# Patient Record
Sex: MALE | Race: WHITE
[De-identification: names, ages, dates, MRNs, and addresses within clinical notes are randomized per-mention and may not be internally consistent; named-entity substitution may affect disease eponyms.]

---

## 2018-08-05 ENCOUNTER — HOSPITAL ENCOUNTER (EMERGENCY)
Dept: HOSPITAL 62 - ER | Age: 28
Discharge: HOME | End: 2018-08-05
Payer: COMMERCIAL

## 2018-08-05 VITALS — DIASTOLIC BLOOD PRESSURE: 76 MMHG | SYSTOLIC BLOOD PRESSURE: 111 MMHG

## 2018-08-05 DIAGNOSIS — R10.11: ICD-10-CM

## 2018-08-05 DIAGNOSIS — R11.2: ICD-10-CM

## 2018-08-05 DIAGNOSIS — R61: ICD-10-CM

## 2018-08-05 DIAGNOSIS — N13.2: Primary | ICD-10-CM

## 2018-08-05 DIAGNOSIS — R23.1: ICD-10-CM

## 2018-08-05 LAB
ADD MANUAL DIFF: NO
ALBUMIN SERPL-MCNC: 4.4 G/DL (ref 3.5–5)
ALP SERPL-CCNC: 48 U/L (ref 38–126)
ALT SERPL-CCNC: 20 U/L (ref 21–72)
ANION GAP SERPL CALC-SCNC: 16 MMOL/L (ref 5–19)
APPEARANCE UR: (no result)
APTT PPP: YELLOW S
AST SERPL-CCNC: 16 U/L (ref 17–59)
BASOPHILS # BLD AUTO: 0 10^3/UL (ref 0–0.2)
BASOPHILS NFR BLD AUTO: 0.3 % (ref 0–2)
BILIRUB DIRECT SERPL-MCNC: 0.3 MG/DL (ref 0–0.4)
BILIRUB SERPL-MCNC: 0.5 MG/DL (ref 0.2–1.3)
BILIRUB UR QL STRIP: NEGATIVE
BUN SERPL-MCNC: 16 MG/DL (ref 7–20)
CALCIUM: 10 MG/DL (ref 8.4–10.2)
CHLORIDE SERPL-SCNC: 103 MMOL/L (ref 98–107)
CO2 SERPL-SCNC: 25 MMOL/L (ref 22–30)
EOSINOPHIL # BLD AUTO: 0 10^3/UL (ref 0–0.6)
EOSINOPHIL NFR BLD AUTO: 0.2 % (ref 0–6)
ERYTHROCYTE [DISTWIDTH] IN BLOOD BY AUTOMATED COUNT: 12.8 % (ref 11.5–14)
GLUCOSE SERPL-MCNC: 106 MG/DL (ref 75–110)
GLUCOSE UR STRIP-MCNC: NEGATIVE MG/DL
HCT VFR BLD CALC: 37.5 % (ref 37.9–51)
HGB BLD-MCNC: 13.2 G/DL (ref 13.5–17)
KETONES UR STRIP-MCNC: NEGATIVE MG/DL
LIPASE SERPL-CCNC: 103.9 U/L (ref 23–300)
LYMPHOCYTES # BLD AUTO: 3 10^3/UL (ref 0.5–4.7)
LYMPHOCYTES NFR BLD AUTO: 42.3 % (ref 13–45)
MCH RBC QN AUTO: 32.3 PG (ref 27–33.4)
MCHC RBC AUTO-ENTMCNC: 35.3 G/DL (ref 32–36)
MCV RBC AUTO: 92 FL (ref 80–97)
MONOCYTES # BLD AUTO: 0.5 10^3/UL (ref 0.1–1.4)
MONOCYTES NFR BLD AUTO: 6.6 % (ref 3–13)
NEUTROPHILS # BLD AUTO: 3.6 10^3/UL (ref 1.7–8.2)
NEUTS SEG NFR BLD AUTO: 50.6 % (ref 42–78)
NITRITE UR QL STRIP: NEGATIVE
PH UR STRIP: 8 [PH] (ref 5–9)
PLATELET # BLD: 246 10^3/UL (ref 150–450)
POTASSIUM SERPL-SCNC: 3.7 MMOL/L (ref 3.6–5)
PROT SERPL-MCNC: 6.9 G/DL (ref 6.3–8.2)
PROT UR STRIP-MCNC: NEGATIVE MG/DL
RBC # BLD AUTO: 4.09 10^6/UL (ref 4.35–5.55)
SODIUM SERPL-SCNC: 143.6 MMOL/L (ref 137–145)
SP GR UR STRIP: 1.01
TOTAL CELLS COUNTED % (AUTO): 100 %
UROBILINOGEN UR-MCNC: NEGATIVE MG/DL (ref ?–2)
WBC # BLD AUTO: 7.2 10^3/UL (ref 4–10.5)

## 2018-08-05 PROCEDURE — 96375 TX/PRO/DX INJ NEW DRUG ADDON: CPT

## 2018-08-05 PROCEDURE — 80053 COMPREHEN METABOLIC PANEL: CPT

## 2018-08-05 PROCEDURE — 99284 EMERGENCY DEPT VISIT MOD MDM: CPT

## 2018-08-05 PROCEDURE — 83690 ASSAY OF LIPASE: CPT

## 2018-08-05 PROCEDURE — 87086 URINE CULTURE/COLONY COUNT: CPT

## 2018-08-05 PROCEDURE — 96361 HYDRATE IV INFUSION ADD-ON: CPT

## 2018-08-05 PROCEDURE — 81001 URINALYSIS AUTO W/SCOPE: CPT

## 2018-08-05 PROCEDURE — 85025 COMPLETE CBC W/AUTO DIFF WBC: CPT

## 2018-08-05 PROCEDURE — 96374 THER/PROPH/DIAG INJ IV PUSH: CPT

## 2018-08-05 PROCEDURE — 36415 COLL VENOUS BLD VENIPUNCTURE: CPT

## 2018-08-05 PROCEDURE — 76705 ECHO EXAM OF ABDOMEN: CPT

## 2018-08-05 NOTE — ER DOCUMENT REPORT
ED GI/





- General


Stated Complaint: FLANK PAIN


Time Seen by Provider: 08/05/18 04:14


Notes: 


Patient is a 27-year-old male who comes emergency department for chief 

complaint of right upper quadrant abdominal pain that radiates around his right 

flank and about 5 episodes of vomiting.  Symptoms started about 5 hours ago.  

Patient denies fever chills, had a normal bowel movement in the past day.  He 

has had kidney stones in the past, kidney stone removal, has had a history of 

pneumothorax.  He denies smoking, alcohol, or recreational drugs.  He denies 

any daily medications.








- Related Data


Allergies/Adverse Reactions: 


 





No Known Allergies Allergy (Verified 08/05/18 04:40)


 











Past Medical History





- General


Information source: Patient





- Social History


Smoking Status: Never Smoker


Frequency of alcohol use: None


Drug Abuse: None


Lives with: Family


Family History: Reviewed & Not Pertinent


Pulmonary Medical History: Reports: Other - Pneumothorax


Renal/ Medical History: Reports: Hx Kidney Stones


Past Surgical History: Reports: Hx Genitourinary Surgery - Ureteral stent





- Immunizations


Immunizations up to date: Yes


Hx Diphtheria, Pertussis, Tetanus Vaccination: Yes





Review of Systems





- Review of Systems


Constitutional: No symptoms reported


EENT: No symptoms reported


Cardiovascular: No symptoms reported


Respiratory: No symptoms reported


Gastrointestinal: See HPI


Genitourinary: See HPI


Male Genitourinary: See HPI


Musculoskeletal: No symptoms reported


Skin: No symptoms reported


Hematologic/Lymphatic: No symptoms reported


Neurological/Psychological: No symptoms reported





Physical Exam





- Vital signs


Vitals: 


 











Temp Pulse Resp BP Pulse Ox


 


 98.1 F   82   27 H  127/100 H  99 


 


 08/05/18 04:14  08/05/18 04:14  08/05/18 04:14  08/05/18 04:14  08/05/18 04:14














- Notes


Notes: 


GENERAL: Patient slightly pale, appears to be in pain, has difficulty holding 

still


HEAD: Normocephalic, atraumatic.


EYES: Pupils equal, round, and reactive to light. Extraocular movements intact.


ENT: Oral mucosa moist, tongue midline. 


NECK: Full range of motion. Supple. Trachea midline.


LUNGS: Clear to auscultation bilaterally, no wheezes, rales, or rhonchi. No 

respiratory distress.


HEART: Regular rate and rhythm. No murmur


ABDOMEN: There is general upper abdominal tenderness, no specific guarding, 

lower abdomen is benign


EXTREMITIES: Moves all 4 extremities spontaneously. No edema, normal radial and 

dorsalis pedis pulses bilaterally. No cyanosis.


BACK: no cervical, thoracic, lumbar midline tenderness.  No CVA tenderness 

noted.  No saddle anesthesia, normal distal neurovascular exam. 


NEUROLOGICAL: Alert and oriented x3. Normal speech. [cranial nerves II through 

XII grossly intact]. 


PSYCH: Slightly agitated


SKIN: Slightly pale, not diaphoretic





Course





- Re-evaluation


Re-evalutation: 


Patient slightly pale, uncomfortable, has general upper abdominal pain, no CVA 

tenderness, no respiratory distress or decreased breath sounds suggesting 

pneumothorax.  No hypoxia or tachycardia.





Patient much more comfortable after medications.  CBC, chemistry, lipase 

unremarkable.  Right upper quadrant ultrasound unremarkable but right kidney 

does have mild hydronephrosis.  Suspect this is ureterolithiasis.  Urine shows 

no leukocyte esterase, no nitrites, however there is 1+ bacteria and a few 

white blood cells. No overt infection. No fever.  Patient doing well now, 

tolerating p.o. without any difficulty.  I discussed results with patient in 

detail.  Decision was made for patient to follow-up with urology, to cover 

patient with antibiotic in case, provide symptom management, and to have him 

return if he worsens in any way.  Return precautions discussed.  Patient states 

understanding and agreement.








- Vital Signs


Vital signs: 


 











Temp Pulse Resp BP Pulse Ox


 


 98.5 F   82   13   111/76   99 


 


 08/05/18 06:56  08/05/18 04:14  08/05/18 06:55  08/05/18 06:55  08/05/18 06:55














- Laboratory


Result Diagrams: 


 08/05/18 04:22





 08/05/18 04:22


Laboratory results interpreted by me: 


 











  08/05/18 08/05/18 08/05/18





  04:22 04:22 05:55


 


RBC  4.09 L  


 


Hgb  13.2 L  


 


Hct  37.5 L  


 


AST   16 L 


 


ALT   20 L 


 


Urine Blood    MODERATE H


 


Urine Ascorbic Acid    20 H














Discharge





- Discharge


Clinical Impression: 


 Ureterolithiasis, Right flank pain





Vomiting


Qualifiers:


 Vomiting type: unspecified Vomiting Intractability: non-intractable Nausea 

presence: with nausea Qualified Code(s): R11.2 - Nausea with vomiting, 

unspecified





Condition: Stable


Disposition: HOME, SELF-CARE


Additional Instructions: 


Your evaluation is consistent with passing a kidney stone from the right kidney.


Take Percocet for pain if needed, Zofran for nausea pain, Flomax as directed. 

We have added antibiotic coverage.


Please call the urology follow-up on Monday to be seen for additional 

evaluation and treatment.  See referral below.


Return if you worsen in anyway including returned or severe pain, vomiting, 

fever of 100.4 or greater, or any other concerning symptoms.





______________________





[facility]


[address] [place], [place] [address] [phone]


Prescriptions: 


Cephalexin Monohydrate [Keflex 500 mg Capsule] 500 mg PO BID #14 capsule


Ondansetron [Zofran Odt 4 mg Tablet] 1 - 2 tab PO Q4H PRN #20 tab.rapdis


 PRN Reason: For Nausea/Vomiting


Oxycodone HCl/Acetaminophen [Percocet 5-325 mg Tablet] 1 - 2 tab PO Q4H PRN #20 

tablet


 PRN Reason: 


Tamsulosin HCl [Flomax 0.4 mg Cap.sr] 0.4 mg PO DAILY #7 cap.sr.24h

## 2018-08-05 NOTE — RADIOLOGY REPORT (SQ)
Ultrasound right upper quadrant on 8/5/2018 at 4:57 AM



CLINICAL INDICATION: Right upper quadrant pain and right back

pain, vomiting



COMPARISON: None



FINDINGS: Multiple sonographic images are obtained throughout the

right upper quadrant, both transverse and sagittal images are

obtained. Pancreas is largely obscured. Visualized aorta is

unremarkable without evidence of an aneurysm. Visualized liver is

homogeneous without focal liver lesion or evidence of

intrahepatic biliary ductal dilatation. There are no gallstones,

gallbladder wall thickening or pericholecystic fluid. Common duct

measures 3 mm which is within normal limits mitigating against

obstruction of the biliary tree. Very mild right hydronephrosis

is noted of unknown definite etiology. Consider follow-up stone

protocol CT. No free fluid is noted in the right upper quadrant.



IMPRESSION:

1. Very mild right hydronephrosis of unknown etiology on this

exam. Based upon the patient's symptoms, this could be related to

right-sided ureteral stone and consider follow-up stone protocol

CT.

2. Otherwise essentially unremarkable.

## 2018-08-08 ENCOUNTER — HOSPITAL ENCOUNTER (EMERGENCY)
Dept: HOSPITAL 62 - ER | Age: 28
Discharge: HOME | End: 2018-08-08
Payer: COMMERCIAL

## 2018-08-08 VITALS — DIASTOLIC BLOOD PRESSURE: 70 MMHG | SYSTOLIC BLOOD PRESSURE: 121 MMHG

## 2018-08-08 DIAGNOSIS — R10.9: ICD-10-CM

## 2018-08-08 DIAGNOSIS — R31.9: ICD-10-CM

## 2018-08-08 DIAGNOSIS — R11.2: ICD-10-CM

## 2018-08-08 DIAGNOSIS — N13.2: Primary | ICD-10-CM

## 2018-08-08 LAB
ADD MANUAL DIFF: NO
ALBUMIN SERPL-MCNC: 4.1 G/DL (ref 3.5–5)
ALP SERPL-CCNC: 51 U/L (ref 38–126)
ALT SERPL-CCNC: 19 U/L (ref 21–72)
ANION GAP SERPL CALC-SCNC: 12 MMOL/L (ref 5–19)
APPEARANCE UR: CLEAR
APTT PPP: (no result) S
AST SERPL-CCNC: 15 U/L (ref 17–59)
BASOPHILS # BLD AUTO: 0 10^3/UL (ref 0–0.2)
BASOPHILS NFR BLD AUTO: 0.6 % (ref 0–2)
BILIRUB DIRECT SERPL-MCNC: 0.2 MG/DL (ref 0–0.4)
BILIRUB SERPL-MCNC: 0.5 MG/DL (ref 0.2–1.3)
BILIRUB UR QL STRIP: NEGATIVE
BUN SERPL-MCNC: 13 MG/DL (ref 7–20)
CALCIUM: 9.3 MG/DL (ref 8.4–10.2)
CHLORIDE SERPL-SCNC: 108 MMOL/L (ref 98–107)
CO2 SERPL-SCNC: 25 MMOL/L (ref 22–30)
EOSINOPHIL # BLD AUTO: 0 10^3/UL (ref 0–0.6)
EOSINOPHIL NFR BLD AUTO: 0.1 % (ref 0–6)
ERYTHROCYTE [DISTWIDTH] IN BLOOD BY AUTOMATED COUNT: 13 % (ref 11.5–14)
GLUCOSE SERPL-MCNC: 92 MG/DL (ref 75–110)
GLUCOSE UR STRIP-MCNC: NEGATIVE MG/DL
HCT VFR BLD CALC: 37.9 % (ref 37.9–51)
HGB BLD-MCNC: 13.3 G/DL (ref 13.5–17)
KETONES UR STRIP-MCNC: NEGATIVE MG/DL
LIPASE SERPL-CCNC: 112.3 U/L (ref 23–300)
LYMPHOCYTES # BLD AUTO: 1.6 10^3/UL (ref 0.5–4.7)
LYMPHOCYTES NFR BLD AUTO: 23.6 % (ref 13–45)
MCH RBC QN AUTO: 32.5 PG (ref 27–33.4)
MCHC RBC AUTO-ENTMCNC: 35.2 G/DL (ref 32–36)
MCV RBC AUTO: 92 FL (ref 80–97)
MONOCYTES # BLD AUTO: 0.5 10^3/UL (ref 0.1–1.4)
MONOCYTES NFR BLD AUTO: 7.8 % (ref 3–13)
NEUTROPHILS # BLD AUTO: 4.5 10^3/UL (ref 1.7–8.2)
NEUTS SEG NFR BLD AUTO: 67.9 % (ref 42–78)
NITRITE UR QL STRIP: NEGATIVE
PH UR STRIP: 7 [PH] (ref 5–9)
PLATELET # BLD: 245 10^3/UL (ref 150–450)
POTASSIUM SERPL-SCNC: 3.8 MMOL/L (ref 3.6–5)
PROT SERPL-MCNC: 6.6 G/DL (ref 6.3–8.2)
PROT UR STRIP-MCNC: NEGATIVE MG/DL
RBC # BLD AUTO: 4.11 10^6/UL (ref 4.35–5.55)
SODIUM SERPL-SCNC: 144.6 MMOL/L (ref 137–145)
SP GR UR STRIP: 1
TOTAL CELLS COUNTED % (AUTO): 100 %
UROBILINOGEN UR-MCNC: NEGATIVE MG/DL (ref ?–2)
WBC # BLD AUTO: 6.6 10^3/UL (ref 4–10.5)

## 2018-08-08 PROCEDURE — 80053 COMPREHEN METABOLIC PANEL: CPT

## 2018-08-08 PROCEDURE — 85025 COMPLETE CBC W/AUTO DIFF WBC: CPT

## 2018-08-08 PROCEDURE — 99284 EMERGENCY DEPT VISIT MOD MDM: CPT

## 2018-08-08 PROCEDURE — 96375 TX/PRO/DX INJ NEW DRUG ADDON: CPT

## 2018-08-08 PROCEDURE — 36415 COLL VENOUS BLD VENIPUNCTURE: CPT

## 2018-08-08 PROCEDURE — 83690 ASSAY OF LIPASE: CPT

## 2018-08-08 PROCEDURE — 96374 THER/PROPH/DIAG INJ IV PUSH: CPT

## 2018-08-08 PROCEDURE — 76380 CAT SCAN FOLLOW-UP STUDY: CPT

## 2018-08-08 PROCEDURE — 81001 URINALYSIS AUTO W/SCOPE: CPT

## 2018-08-08 PROCEDURE — 96372 THER/PROPH/DIAG INJ SC/IM: CPT

## 2018-08-08 PROCEDURE — 96361 HYDRATE IV INFUSION ADD-ON: CPT

## 2018-08-08 NOTE — RADIOLOGY REPORT (SQ)
EXAM DESCRIPTION:  CT LTD RENAL STONE PROTOCOL ON



COMPLETED DATE/TIME:  8/8/2018 8:37 am



REASON FOR STUDY:  Rt flank pain



COMPARISON:  None.



TECHNIQUE:  CT scan of the abdomen and pelvis performed without intravenous or oral contrast. Images 
reviewed with lung, soft tissue, and bone windows. Reconstructed coronal and sagittal MPR images revi
ewed. All images stored on PACS.

All CT scanners at this facility use dose modulation, iterative reconstruction, and/or weight based d
osing when appropriate to reduce radiation dose to as low as reasonably achievable (ALARA).

[facility]: Dose Right  [facility]: CareDose    [facility]: Dose Right    [facility]: Teradose 4D    [facility]: Smart Technologies



RADIATION DOSE:  CT Rad equipment meets quality standard of care and radiation dose reduction techniq
ues were employed. CTDIvol: 5.5 mGy. DLP: 299 mGy-cm.mGy.



LIMITATIONS:  None.



FINDINGS:  LOWER CHEST: No significant findings. No nodules or infiltrates.

NON-CONTRASTED LIVER, SPLEEN, ADRENALS: Evaluation limited by lack of IV contrast. No identified sign
ificant masses.

PANCREAS: No masses. No peripancreatic inflammatory changes.

GALLBLADDER: No identified stones by CT criteria. No inflammatory changes to suggest cholecystitis.

RIGHT KIDNEY AND URETER: There is mild dilatation of the right collecting system and right ureter.   
There is a very small stone at the right UVJ.  This measures 2.3 mm greatest diameter.   Mild right-s
ided hydronephrosis and hydroureter.

LEFT KIDNEY AND URETER: No suspicious masses. Assessment limited by lack of IV contrast.   No signifi
cant calcifications.   No hydronephrosis or hydroureter.

AORTA AND RETROPERITONEUM: No aneurysm. No retroperitoneal masses or adenopathy.

BOWEL AND PERITONEAL CAVITY: No obvious masses or inflammatory changes. No free fluid.

APPENDIX: Normal.

PELVIS, BLADDER, AND ABDOMINAL WALL:No abnormal masses. No free fluid. Bladder normal.

BONES: No significant findings.

OTHER: No other significant finding.



IMPRESSION:  Mild right-sided hydronephrosis secondary a to 2.3 mm right UVJ stone.



COMMENT:  Quality ID # [id number]: Final reports with documentation of one or more dose reduction techniques
 (e.g., Automated exposure control, adjustment of the mA and/or kV according to patient size, use of 
iterative reconstruction technique)



TECHNICAL DOCUMENTATION:  JOB ID:  [id number]

 [employer]- All Rights Reserved



Reading location - IP/workstation name: [facility]

## 2018-08-08 NOTE — ER DOCUMENT REPORT
ED General





- General


Chief Complaint: Flank Pain


Stated Complaint: FLANK PAIN


Time Seen by Provider: 08/08/18 07:29


TRAVEL OUTSIDE OF THE [place] IN LAST 30 DAYS: No





- HPI


Notes: 





Patient is a 27-year-old male with a history of previous kidney stones lung 

surgery who presents to the ED complaining of right flank pain that radiates 

around into his groin, nausea, vomiting, hematuria over the last 3 days.  

Patient was evaluated 3 days ago and was diagnosed with suspected ureteral 

stones at that time clinically and with an ultrasound showing right 

hydronephrosis.  Patient did see a urologist on Monday who ordered a CT scan to 

be performed this morning, but the pain returned.  Patient states that he 

continues to have sharp right-sided flank pain and is unable to find a 

comfortable position.  He has had decreased p.o. intake over the last day 

because of the pain.  Pain had been intermittent.  Patient states that he did 

pass 1 or 2 smaller stones, but believes that there may be another one in 

there.  Denies any drug allergies.  No other concerns or complaints.  Denies 

any headache, fever, URI, sore throat, chest pain, palpitations, syncope, cough

, shortness of breath, wheeze, dyspnea, abdominal pain, diarrhea, loss of 

control of bowel or bladder, numbness/tingling, saddle anesthesia, muscle 

paralysis/weakness, or rash.





- Related Data


Allergies/Adverse Reactions: 


 





No Known Allergies Allergy (Verified 08/08/18 07:22)


 











Past Medical History





- Social History


Smoking Status: Never Smoker


Family History: Reviewed & Not Pertinent


Renal/ Medical History: Reports: Hx Kidney Stones.  Denies: Hx Peritoneal 

Dialysis


Past Surgical History: Reports: Hx Genitourinary Surgery - Ureteral stent





- Immunizations


Immunizations up to date: Yes


Hx Diphtheria, Pertussis, Tetanus Vaccination: Yes





Review of Systems





- Review of Systems


-: Yes All other systems reviewed and negative





Physical Exam





- Vital signs


Vitals: 


 











Resp BP Pulse Ox


 


 18   139/89 H  99 


 


 08/08/18 08:43  08/08/18 08:43  08/08/18 08:43














- Notes


Notes: 





PHYSICAL EXAMINATION:





GENERAL: Well-appearing, well-nourished and in no acute distress.





LUNGS: Breath sounds clear to auscultation bilaterally and equal.  No wheezes 

rales or rhonchi.





HEART: Regular rate and rhythm without murmurs, rubs, gallops.





ABDOMEN: Soft, nontender, nondistended abdomen.  No guarding, no rebound.  No 

masses appreciated.  Normal bowel sounds present.  No CVA tenderness 

bilaterally.





Musculoskeletal: FROM to passive/active. Strength 5+/5. 





Extremities:  No cyanosis, clubbing, or edema b/l.  Peripheral pulses 2+.  

Capillary refill less than 3 seconds.





NEUROLOGICAL:  Normal speech, normal gait. 





PSYCH: Normal mood, normal affect.





SKIN: Warm, Dry, normal turgor, no rashes or lesions noted.





Course





- Re-evaluation


Re-evalutation: 





08/08/18 07:33


Pt appears uncomfortable and has active rt flank pain with n/v in the exam room.


Phenergan IM ordered along with meds/labs.


CT ordered for further evaluation for suspected ureteral stone(s). 





08/08/18 10:41


Patient is an afebrile, well-hydrated, 27-year-old male who presents to the ED 

with a ureteral stone on the right side.  Vitals are acceptable without any 

significant tachycardia, tachypnea, or hypoxia.  PE is otherwise unremarkable.  

Patient was given fluids, nausea medication, as well as pain medication.  He is 

nontoxic-appearing and is able to tolerate p.o. at this time.  CBC, CMP, lipase

, urinalysis were otherwise acceptable without any signs of infection.  See CT 

scan result.  Patient only has tablets of pain medication, nausea medication, 

and Flomax at home.  I will add an additional prescription for his nausea 

medication as well as Flomax temporarily.  Patient did receive about 30 tablets 

of medication 2-3 days ago.  No other labs or imaging warranted at this time 

based on H&P.  Low suspicion/risk for urosepsis, acute appendicitis, bowel 

obstruction, acute cholecystitis, perforated diverticulitis, incarcerated hernia

, pancreatitis, perforated ulcer, peritonitis, sepsis, severe dehydration, or 

other systemic emergent condition at this time.  Patient is aware that his 

condition can change from initial presentation and he needs to monitor symptoms 

closely and seek medical attention if any acute changes.  Conservative measures 

otherwise for symptoms.  Recheck with PCM in 2-3 days.  Keep appointment with 

your urologist in 12 days.  Return to the ED with any worsening/concerning 

symptoms otherwise as reviewed in discharge.  Patient is in agreement.





- Vital Signs


Vital signs: 


 











Temp Pulse Resp BP Pulse Ox


 


       20   139/72 H  100 


 


       08/08/18 09:01  08/08/18 09:01  08/08/18 09:01














- Laboratory


Result Diagrams: 


 08/08/18 08:20





 08/08/18 09:40


Laboratory results interpreted by me: 


 











  08/08/18 08/08/18 08/08/18





  08:20 09:15 09:40


 


RBC  4.11 L  


 


Hgb  13.3 L  


 


Chloride    108 H


 


AST    15 L


 


ALT    19 L


 


Urine Blood   LARGE H 


 


Ur Leukocyte Esterase   SMALL H 














Discharge





- Discharge


Clinical Impression: 


 Ureteral stone, Right flank pain





Condition: Stable


Disposition: HOME, SELF-CARE


Instructions:  Antinausea Medication ([facility]), Kidney Stone ([facility])


Additional Instructions: 


Push fluids (i.e. water, cranberry juice)


Proper hygenic technique


Keep the skin clean


Tylenol/ibuprofen as needed


Take medications as directed


F/u with your PCM in 2-3 days for a recheck


Keep appointment with your urologist in 12 days (20th)


Return to the ED with any worsening symptoms and/or development of fever, 

headache, chest pain, palpitations, syncope, shortness of breath, trouble 

breathing, abdominal pain, n/v/d, blood in stool/urine, loss of control of bowel

/bladder, urinary retention, or other worsening symptoms that are concerning to 

you.


Prescriptions: 


Ondansetron [Zofran Odt 4 mg Tablet] 1 - 2 tab PO Q4H PRN #15 tab.rapdis


 PRN Reason: For Nausea/Vomiting


Tamsulosin HCl [Flomax] 0.4 mg PO DAILY #10 cap.er.24h


Forms:  Elevated Blood Pressure


Referrals: 


KRISTEN DALTON MD [Primary Care Provider] - 08/20/18

## 2018-08-17 ENCOUNTER — HOSPITAL ENCOUNTER (OUTPATIENT)
Dept: HOSPITAL 62 - RAD | Age: 28
End: 2018-08-17
Payer: COMMERCIAL

## 2018-08-17 DIAGNOSIS — N20.1: Primary | ICD-10-CM

## 2018-08-17 PROCEDURE — 74018 RADEX ABDOMEN 1 VIEW: CPT

## 2018-08-17 PROCEDURE — 76770 US EXAM ABDO BACK WALL COMP: CPT

## 2018-08-17 NOTE — RADIOLOGY REPORT (SQ)
EXAM DESCRIPTION:  KUB/ABDOMEN (SINGLE VIEW)



COMPLETED DATE/TIME:  8/17/2018 1:01 pm



REASON FOR STUDY:  CALCULUS OF URETER (N20.1) N20.1  CALCULUS OF URETER



COMPARISON:  CT abdomen pelvis 8/8/2018



NUMBER OF VIEWS:  One view.



TECHNIQUE:   Supine radiographic image of the abdomen acquired.



LIMITATIONS:  None.



FINDINGS:  BOWEL GAS PATTERN: Normal bowel gas pattern. No dilated loops.  Moderate stool in the desc
ending colon.

CALCIFICATIONS: A 2 mm distal right ureteral stone at the ureteral orifice seen on CT 8/8/2018 is no 
longer identified.  Pelvic phleboliths are present.

SOFT TISSUES: No gross mass or suggestion of organomegaly.

HARDWARE: None in the abdomen.

BONES: No acute fracture. No worrisome bone lesions.

OTHER: No other significant finding.



IMPRESSION:  2 mm distal right ureteral calculus identified on CT exam 8/8/2018 is no longer identifi
ed



TECHNICAL DOCUMENTATION:  JOB ID:  [id number]

 2011 [employer]- All Rights Reserved



Reading location - IP/workstation name: [facility]

## 2018-08-17 NOTE — RADIOLOGY REPORT (SQ)
EXAM DESCRIPTION:  U/S RETROPERITON (RENAL/AORTA)



COMPLETED DATE/TIME:  8/17/2018 1:25 pm



REASON FOR STUDY:  CALCULUS OF URETER (N20.1) N20.1  CALCULUS OF URETER



COMPARISON:  KUB 8/17/2018

CT abdomen pelvis dated August 2018

Abdominal ultrasound 8/5/2018



TECHNIQUE:  Dynamic and static grayscale images acquired of the kidneys and bladder and recorded on P
ACS. Additional selected color Doppler and spectral images recorded.



LIMITATIONS:  None.



FINDINGS:  RIGHT KIDNEY: Normal size, 10 cm in length. Normal echogenicity. No solid or suspicious ma
sses. No hydronephrosis. No calcifications.

LEFT KIDNEY:  Normal size, 11.6 cm in length. Normal echogenicity. No solid or suspicious masses. No 
hydronephrosis. No calcifications.

BLADDER: Ureteral jets were not identified.  Bladder distended without gross mucosal abnormality

OTHER FINDINGS: No other significant finding.



IMPRESSION:  Normal size kidneys.  No hydronephrosis



TECHNICAL DOCUMENTATION:  JOB ID:  [id number]

 2011 [employer]- All Rights Reserved



Reading location - IP/workstation name: [facility]-RR2